# Patient Record
Sex: FEMALE | Race: WHITE | Employment: OTHER | URBAN - METROPOLITAN AREA
[De-identification: names, ages, dates, MRNs, and addresses within clinical notes are randomized per-mention and may not be internally consistent; named-entity substitution may affect disease eponyms.]

---

## 2024-03-20 ENCOUNTER — HOSPITAL ENCOUNTER (OUTPATIENT)
Facility: HOSPITAL | Age: 79
Setting detail: OUTPATIENT SURGERY
Discharge: HOME | End: 2024-03-20
Attending: INTERNAL MEDICINE | Admitting: INTERNAL MEDICINE
Payer: MEDICARE

## 2024-03-20 ENCOUNTER — ANESTHESIA EVENT (OUTPATIENT)
Facility: HOSPITAL | Age: 79
End: 2024-03-20
Payer: MEDICARE

## 2024-03-20 ENCOUNTER — ANESTHESIA (OUTPATIENT)
Facility: HOSPITAL | Age: 79
End: 2024-03-20
Payer: MEDICARE

## 2024-03-20 VITALS
SYSTOLIC BLOOD PRESSURE: 133 MMHG | HEIGHT: 64 IN | TEMPERATURE: 97.8 F | OXYGEN SATURATION: 98 % | RESPIRATION RATE: 20 BRPM | BODY MASS INDEX: 21.83 KG/M2 | WEIGHT: 127.87 LBS | DIASTOLIC BLOOD PRESSURE: 58 MMHG | HEART RATE: 82 BPM

## 2024-03-20 PROCEDURE — 6360000002 HC RX W HCPCS: Performed by: NURSE ANESTHETIST, CERTIFIED REGISTERED

## 2024-03-20 PROCEDURE — 2580000003 HC RX 258: Performed by: NURSE ANESTHETIST, CERTIFIED REGISTERED

## 2024-03-20 PROCEDURE — 6370000000 HC RX 637 (ALT 250 FOR IP): Performed by: INTERNAL MEDICINE

## 2024-03-20 PROCEDURE — 88305 TISSUE EXAM BY PATHOLOGIST: CPT

## 2024-03-20 PROCEDURE — 7100000010 HC PHASE II RECOVERY - FIRST 15 MIN: Performed by: INTERNAL MEDICINE

## 2024-03-20 PROCEDURE — 7100000011 HC PHASE II RECOVERY - ADDTL 15 MIN: Performed by: INTERNAL MEDICINE

## 2024-03-20 PROCEDURE — 3600007512: Performed by: INTERNAL MEDICINE

## 2024-03-20 PROCEDURE — 3700000000 HC ANESTHESIA ATTENDED CARE: Performed by: INTERNAL MEDICINE

## 2024-03-20 PROCEDURE — 3600007502: Performed by: INTERNAL MEDICINE

## 2024-03-20 PROCEDURE — 2500000003 HC RX 250 WO HCPCS: Performed by: NURSE ANESTHETIST, CERTIFIED REGISTERED

## 2024-03-20 PROCEDURE — 3700000001 HC ADD 15 MINUTES (ANESTHESIA): Performed by: INTERNAL MEDICINE

## 2024-03-20 RX ORDER — SODIUM CHLORIDE 9 MG/ML
INJECTION, SOLUTION INTRAVENOUS CONTINUOUS PRN
Status: DISCONTINUED | OUTPATIENT
Start: 2024-03-20 | End: 2024-03-20 | Stop reason: SDUPTHER

## 2024-03-20 RX ORDER — SODIUM CHLORIDE 9 MG/ML
INJECTION, SOLUTION INTRAVENOUS CONTINUOUS
Status: DISCONTINUED | OUTPATIENT
Start: 2024-03-20 | End: 2024-03-20 | Stop reason: HOSPADM

## 2024-03-20 RX ORDER — CHOLECALCIFEROL (VITAMIN D3) 1250 MCG
50000 CAPSULE ORAL DAILY
COMMUNITY

## 2024-03-20 RX ORDER — PROPOFOL 10 MG/ML
INJECTION, EMULSION INTRAVENOUS PRN
Status: DISCONTINUED | OUTPATIENT
Start: 2024-03-20 | End: 2024-03-20 | Stop reason: SDUPTHER

## 2024-03-20 RX ORDER — SIMETHICONE 40MG/0.6ML
SUSPENSION, DROPS(FINAL DOSAGE FORM)(ML) ORAL PRN
Status: DISCONTINUED | OUTPATIENT
Start: 2024-03-20 | End: 2024-03-20 | Stop reason: ALTCHOICE

## 2024-03-20 RX ADMIN — SODIUM CHLORIDE: 900 INJECTION, SOLUTION INTRAVENOUS at 08:16

## 2024-03-20 RX ADMIN — LIDOCAINE HYDROCHLORIDE 30 MG: 20 INJECTION, SOLUTION INFILTRATION; PERINEURAL at 08:20

## 2024-03-20 RX ADMIN — PROPOFOL 150 MCG/KG/MIN: 10 INJECTION, EMULSION INTRAVENOUS at 08:21

## 2024-03-20 RX ADMIN — PROPOFOL 50 MG: 10 INJECTION, EMULSION INTRAVENOUS at 08:20

## 2024-03-20 ASSESSMENT — PAIN - FUNCTIONAL ASSESSMENT: PAIN_FUNCTIONAL_ASSESSMENT: NONE - DENIES PAIN

## 2024-03-20 NOTE — ANESTHESIA PRE PROCEDURE
Department of Anesthesiology  Preprocedure Note       Name:  Emma Brown   Age:  78 y.o.  :  1945                                          MRN:  [id number]         Date:  3/20/2024      Surgeon: Surgeon(s):  Kenny Burkett MD    Procedure: Procedure(s):  COLONOSCOPY WITH BIOPSY    Medications prior to admission:   Prior to Admission medications    Medication Sig Start Date End Date Taking? Authorizing Provider   vitamin D (VITAMIN D3) 79410 UNIT CAPS Take 1 capsule by mouth daily   Yes Provider, MD Gerald   SIMVASTATIN PO Take 40 mg by mouth    Automatic Reconciliation, Ar   letrozole (FEMARA) 2.5 MG tablet Take 1 tablet by mouth daily    Automatic Reconciliation, Ar   levothyroxine (SYNTHROID) 100 MCG tablet Take by mouth    Automatic Reconciliation, Ar   metoprolol tartrate (LOPRESSOR) 50 MG tablet Take 1 tablet by mouth daily    Automatic Reconciliation, Ar   triamterene-hydroCHLOROthiazide (MAXZIDE-25) 37.5-25 MG per tablet Take by mouth daily    Automatic Reconciliation, Ar       Current medications:    No current facility-administered medications for this encounter.       Allergies:  No Known Allergies    Problem List:    Patient Active Problem List   Diagnosis Code    History of breast cancer Z85.3       Past Medical History:        Diagnosis Date    Breast cancer (HCC) 2013    right    Cancer (HCC)     BREAST, RECENT DIAGNOSED    History of chemotherapy     Right mastectomy  w/ chemo & radiation    Hypertension     Nausea & vomiting     Radiation therapy complication     Thyroid disease     thyroidectomy       Past Surgical History:        Procedure Laterality Date    BREAST SURGERY Right 2013    mastectomy & ASLN -[facility]-Dr. TATI Banuelos    COLONOSCOPY N/A 2019    COLONOSCOPY (No info to ) performed by Kenny Burkett MD at [facility] ENDOSCOPY    COLONOSCOPY N/A 8/10/2022    COLONOSCOPY performed by Kenny Burkett MD at [facility] ENDOSCOPY    GYN      1 ovary removed,

## 2024-03-20 NOTE — H&P
MOIRA MACDONALD [facility]  [address] [place] [address]  [phone]                     History and Physical     NAME: Emma Brown   :  1945   MRN:  [id number]     HPI:  Pt with hx of colon cancer and personal hx of rectal cancer  s/p  LAR in late . She is here for follow up/surveilance scope.      Past Surgical History:   Procedure Laterality Date    BREAST SURGERY Right 2013    mastectomy & ASLN bx-[facility]-Dr. TATI Banuelos    COLONOSCOPY N/A 2019    COLONOSCOPY (No info to ) performed by Kenny Burkett MD at [facility] ENDOSCOPY    COLONOSCOPY N/A 8/10/2022    COLONOSCOPY performed by Kenny Burkett MD at [facility] ENDOSCOPY    GYN      1 ovary removed, LEFT    HEENT      THYROIDECTOMY    HX PARTIAL COLECTOMY      MASTECTOMY, MODIFIED RADICAL  2013    BREAST MASTECTOMY MODIFIED RADICAL performed by True Banuelos MD at [facility] MAIN OR    MO UNLISTED PROCEDURE ABDOMEN PERITONEUM & OMENTUM      VASCULAR SURGERY      portacath placement -removed     Past Medical History:   Diagnosis Date    Breast cancer (HCC) 2013    right    Cancer (HCC)     BREAST, RECENT DIAGNOSED    History of chemotherapy     Right mastectomy  w/ chemo & radiation    Hypertension     Nausea & vomiting     Radiation therapy complication     Thyroid disease     thyroidectomy     Social History     Tobacco Use    Smoking status: Never    Smokeless tobacco: Never   Vaping Use    Vaping Use: Never used   Substance Use Topics    Alcohol use: Yes    Drug use: No     No Known Allergies  Family History   Problem Relation Age of Onset    Cancer Mother         pancreatic    Cancer Sister         colon cancer     No current facility-administered medications for this encounter.         PHYSICAL EXAM:  General: WD, WN. Alert, cooperative, no acute distress    HEENT: NC, Atraumatic.  PERRLA, EOMI. Anicteric sclerae.  Lungs:  CTA Bilaterally. No

## 2024-03-20 NOTE — PROGRESS NOTES

## 2024-03-20 NOTE — OP NOTE
MOIRA MACDONALD [facility]  [address] [place] [address]  [phone]                   Colonoscopy Operative Report      Indications:    Personal history of colon cancer (screening only)     :  Kenny Burkett MD    Assistants: None    Referring Provider: Drew Ponce MD    Sedation:  MAC anesthesia Propofol    Procedure Details:  After informed consent was obtained with all risks and benefits of procedure explained and preoperative exam completed, the patient was taken to the endoscopy suite and placed in the left lateral decubitus position.  Upon sequential sedation as per above, a digital rectal exam was performed  And was normal.  The Olympus videocolonoscope  was inserted in the rectum and carefully advanced to the surgical anastomosis .  The quality of preparation was excellent.  The colonoscope was slowly withdrawn with careful evaluation between folds. Retroflexion in the rectum was performed and was normal..     Findings:   Rectum: previous surgery ;  Sigmoid:     -Diverticulosis  Descending Colon: normal  Transverse Colon: previous surgery ;  Ascending Colon: surgically absent  Cecum: surgically absent  Terminal Ileum: normal    Interventions:  1 complete polypectomy were performed using cold biopsy forceps and the polyps were  retrieved    Specimen Removed:  specimen #1, 3 mm in size, located in the transverse colon removed by cold biopsy and sent for pathology    Implants: none    Complications: None.     EBL:  None.    Impression: Colon polyp ( diminutive)- removed                        Previous surgical anastomosis ( transverse and rectum) healthy appearing    Recommendations:   -Await pathology.  -Repeat colonoscopy in 3 years.  -High fiber diet.     -Resume normal medication(s)  -Call office for pathology results in 1 week  -Call office for any questions/concerns      Discharge Disposition:  Home in the company of a  when able to ambulate.    Kenny

## 2024-03-20 NOTE — DISCHARGE INSTRUCTIONS
MOIRA MACDONALD [facility]  [address] [place] [address]  [phone]                   Emma M Kevin  [id number]  1945    COLON DISCHARGE INSTRUCTIONS    DISCOMFORT:  Redness at IV site- apply warm compress to area; if redness or soreness persist- contact your physician  There may be a slight amount of blood passed from the rectum  Gaseous discomfort- walking, belching will help relieve any discomfort  You may not operate a vehicle for 12 hours  You may not  engage in an occupation involving machinery or appliances for rest of today  You may not  drink alcoholic beverages for at least 12 hours  Avoid making any critical decisions for at least 24 hour    DIET:   High fiber diet.   - however -  remember your colon is empty and a heavy meal will produce gas.   Avoid these foods:  vegetables, fried / greasy foods, carbonated drinks for today     ACTIVITY:  It is recommended that you spend the remainder of the day resting -  avoid any strenuous activity.  CALL M.D.  ANY SIGN OF:   Increasing pain, nausea, vomiting  Abdominal distension (swelling)  New increased bleeding (oral or rectal)  Fever (chills)  Pain in chest area  Bloody discharge from nose or mouth  Shortness of breath    You may resume your medications    Post procedure diagnosis:  colon polyp ( removed)                                                   Diverticulosis and healthy appearing colonic anastomosis    Follow-up Instructions:    If a specimen was collected, you will receive a letter with the result by mail within two  weeks. Depending on the result this letter will specify your follow up colonoscopy date.      Please call us for any questions or concerns                     Emma LENA Kevin  [id number]  1945        DISCHARGE SUMMARY from Nurse    The following personal items collected during your admission are returned to you:   Dental Appliance:    Vision:    Hearing Aid:    Jewelry:    Clothing:    Other

## 2024-03-20 NOTE — ANESTHESIA POSTPROCEDURE EVALUATION
Department of Anesthesiology  Postprocedure Note    Patient: Emma Brown  MRN: [id number]  YOB: 1945  Date of evaluation: 3/20/2024    Procedure Summary       Date: 03/20/24 Room / Location: [place] [id number] / [facility] ENDOSCOPY    Anesthesia Start: 0816 Anesthesia Stop: 0833    Procedures:       COLONOSCOPY WITH BIOPSY (Lower GI Region)      COLONOSCOPY POLYPECTOMY REMOVAL SNARE/STOMA (Lower GI Region) Diagnosis:       Adenoma of ascending colon      Family history of colon cancer      Family hx colonic polyps      (Adenoma of ascending colon [D12.2])      (Family history of colon cancer [Z80.0])      (Family hx colonic polyps [Z83.719])    Surgeons: Kenny Burkett MD Responsible Provider: Nasim Montague MD    Anesthesia Type: MAC ASA Status: 2            Anesthesia Type: No value filed.    Katarzyna Phase I: Katarzyna Score: 10    Katarzyna Phase II: Katarzyna Score: 10    Anesthesia Post Evaluation    Patient location during evaluation: PACU  Patient participation: complete - patient participated  Level of consciousness: awake  Pain score: 0  Airway patency: patent  Nausea & Vomiting: no nausea and no vomiting  Cardiovascular status: blood pressure returned to baseline  Respiratory status: acceptable  Hydration status: euvolemic  Pain management: adequate    No notable events documented.

## 2024-12-03 ENCOUNTER — TRANSCRIBE ORDERS (OUTPATIENT)
Dept: SCHEDULING | Age: 79
End: 2024-12-03

## 2024-12-03 DIAGNOSIS — Z12.31 ENCOUNTER FOR MAMMOGRAM TO ESTABLISH BASELINE MAMMOGRAM: Primary | ICD-10-CM

## 2025-01-02 ENCOUNTER — HOSPITAL ENCOUNTER (OUTPATIENT)
Facility: HOSPITAL | Age: 80
Discharge: HOME | End: 2025-01-02
Payer: MEDICARE

## 2025-01-02 VITALS — HEIGHT: 63 IN | BODY MASS INDEX: 21.26 KG/M2 | WEIGHT: 120 LBS

## 2025-01-02 DIAGNOSIS — Z12.31 ENCOUNTER FOR MAMMOGRAM TO ESTABLISH BASELINE MAMMOGRAM: ICD-10-CM

## 2025-01-02 PROCEDURE — 77063 BREAST TOMOSYNTHESIS BI: CPT
